# Patient Record
Sex: MALE | Race: WHITE | NOT HISPANIC OR LATINO
[De-identification: names, ages, dates, MRNs, and addresses within clinical notes are randomized per-mention and may not be internally consistent; named-entity substitution may affect disease eponyms.]

---

## 2019-03-26 PROBLEM — Z00.00 ENCOUNTER FOR PREVENTIVE HEALTH EXAMINATION: Status: ACTIVE | Noted: 2019-03-26

## 2019-03-29 ENCOUNTER — APPOINTMENT (OUTPATIENT)
Dept: HEMATOLOGY ONCOLOGY | Facility: CLINIC | Age: 64
End: 2019-03-29
Payer: COMMERCIAL

## 2019-03-29 VITALS
HEART RATE: 85 BPM | RESPIRATION RATE: 16 BRPM | OXYGEN SATURATION: 99 % | WEIGHT: 158 LBS | DIASTOLIC BLOOD PRESSURE: 74 MMHG | BODY MASS INDEX: 23.4 KG/M2 | TEMPERATURE: 98.3 F | HEIGHT: 69 IN | SYSTOLIC BLOOD PRESSURE: 149 MMHG

## 2019-03-29 DIAGNOSIS — R61 GENERALIZED HYPERHIDROSIS: ICD-10-CM

## 2019-03-29 DIAGNOSIS — Z86.59 PERSONAL HISTORY OF OTHER MENTAL AND BEHAVIORAL DISORDERS: ICD-10-CM

## 2019-03-29 DIAGNOSIS — Z87.19 PERSONAL HISTORY OF OTHER DISEASES OF THE DIGESTIVE SYSTEM: ICD-10-CM

## 2019-03-29 DIAGNOSIS — Z78.9 OTHER SPECIFIED HEALTH STATUS: ICD-10-CM

## 2019-03-29 PROCEDURE — 99205 OFFICE O/P NEW HI 60 MIN: CPT

## 2019-03-31 PROBLEM — Z87.19 HISTORY OF HIATAL HERNIA: Status: RESOLVED | Noted: 2019-03-29 | Resolved: 2019-03-31

## 2019-03-31 PROBLEM — Z78.9 CAFFEINE USE: Status: ACTIVE | Noted: 2019-03-29

## 2019-03-31 PROBLEM — Z86.59 HISTORY OF DEPRESSION: Status: RESOLVED | Noted: 2019-03-29 | Resolved: 2019-03-31

## 2019-03-31 PROBLEM — R61 HYPERHIDROSIS: Status: RESOLVED | Noted: 2019-03-29 | Resolved: 2019-03-31

## 2019-03-31 NOTE — ASSESSMENT
[FreeTextEntry1] : This appears to be relatively new finding.\par He does not exhibit signs or symptoms related to these findings, including B symptoms.\par Given the rising white count with a predominance of leukocytes a lymphoproliferative disorder cannot be ruled out at this time.  I will therefore obtain a complete hematological workup.\par This will include a CBC with differential and a blood smear review.\par I will obtain a flow cytometry analysis.\par I will also obtain a BCR-ABL analysis.\par I will also obtain a workup to try to identify reactive reasons for the leukocytosis.\par \par Thank you very much for allowing me to participate in this gentleman's medical care. Should you have any questions or concerns please do not hesitate to call me directly.

## 2019-03-31 NOTE — REVIEW OF SYSTEMS
[Fatigue] : fatigue [Joint Pain] : joint pain [Negative] : Psychiatric [FreeTextEntry1] : NSAID allergy, rash and hives

## 2019-03-31 NOTE — HISTORY OF PRESENT ILLNESS
[de-identified] : This is a very pleasant 63-year-old gentleman the past medical history who on a routine the physical examination was noted to have a leukocytosis and mild thrombocytopenia.  These were repeated and the white count increased to 29,000. The differential showed a predominance of leukocytes.  He is now referred for hematological evaluation of this. He denies  knowledge of any similar finding in the past.  He denies B symptoms.  He denies fevers, chills, headaches, night sweats, loss of appetite or weight, chest pain, chest palpitations, shortness of breath, N/V/D, and pain, new lumps or bumps, signs of blood loss, LE swelling or pain, rashes/ecchymosis/petechiae. [de-identified] : Mr. Duque is here today for an initial visit referred by  for elevated WBC discovered during a routine physical.  [0 - No Distress] : Distress Level: 0

## 2019-04-04 ENCOUNTER — APPOINTMENT (OUTPATIENT)
Dept: HEMATOLOGY ONCOLOGY | Facility: CLINIC | Age: 64
End: 2019-04-04
Payer: COMMERCIAL

## 2019-04-04 VITALS
HEART RATE: 82 BPM | DIASTOLIC BLOOD PRESSURE: 70 MMHG | BODY MASS INDEX: 22.51 KG/M2 | OXYGEN SATURATION: 98 % | RESPIRATION RATE: 17 BRPM | TEMPERATURE: 97.3 F | SYSTOLIC BLOOD PRESSURE: 127 MMHG | WEIGHT: 152 LBS | HEIGHT: 69 IN

## 2019-04-04 PROCEDURE — 99214 OFFICE O/P EST MOD 30 MIN: CPT

## 2019-04-05 NOTE — HISTORY OF PRESENT ILLNESS
[0 - No Distress] : Distress Level: 0 [de-identified] : This is a very pleasant 63-year-old gentleman the past medical history who on a routine the physical examination was noted to have a leukocytosis and mild thrombocytopenia.  These were repeated and the white count increased to 29,000. The differential showed a predominance of leukocytes.  He is now referred for hematological evaluation of this. He denies  knowledge of any similar finding in the past.  He denies B symptoms.  He denies fevers, chills, headaches, night sweats, loss of appetite or weight, chest pain, chest palpitations, shortness of breath, N/V/D, and pain, new lumps or bumps, signs of blood loss, LE swelling or pain, rashes/ecchymosis/petechiae. [de-identified] : Mr. Duque is here today for a follow up visit. He has no complaints to offer.

## 2019-04-05 NOTE — REVIEW OF SYSTEMS
[Fatigue] : fatigue [Joint Pain] : joint pain [Negative] : Heme/Lymph [FreeTextEntry1] : NSAID allergy, rash and hives

## 2019-04-05 NOTE — ASSESSMENT
[FreeTextEntry1] : This appears to be relatively new finding.\par He does not exhibit signs or symptoms related to these findings, including B symptoms.\par Given the rising white count with a predominance of leukocytes a lymphoproliferative disorder cannot be ruled out at this time.  I therefore obtained a complete hematological workup.  This was mostly unremarkable, however, the lab was not able to run a flow cytometry.  This is being resent today.\par Also reported to me today that approximately one to one half years ago he was treated for a tickborne illness through an urgent care in the [facility]. I have requested records from there.\par A CBC with differential and a blood smear review is being repeated today.\par His platelet counts have improved to nearly normal at this time.\par He will return in 3 weeks for f/u.

## 2019-04-12 ENCOUNTER — CHART COPY (OUTPATIENT)
Age: 64
End: 2019-04-12

## 2019-04-25 ENCOUNTER — APPOINTMENT (OUTPATIENT)
Dept: HEMATOLOGY ONCOLOGY | Facility: CLINIC | Age: 64
End: 2019-04-25
Payer: COMMERCIAL

## 2019-04-25 VITALS
RESPIRATION RATE: 18 BRPM | HEART RATE: 64 BPM | DIASTOLIC BLOOD PRESSURE: 70 MMHG | WEIGHT: 150 LBS | TEMPERATURE: 98.1 F | HEIGHT: 69 IN | SYSTOLIC BLOOD PRESSURE: 123 MMHG | OXYGEN SATURATION: 98 % | BODY MASS INDEX: 22.22 KG/M2

## 2019-04-25 PROCEDURE — 99214 OFFICE O/P EST MOD 30 MIN: CPT

## 2019-04-25 NOTE — HISTORY OF PRESENT ILLNESS
[0 - No Distress] : Distress Level: 0 [de-identified] : This is a very pleasant 63-year-old gentleman the past medical history who on a routine the physical examination was noted to have a leukocytosis and mild thrombocytopenia.  These were repeated and the white count increased to 29,000. The differential showed a predominance of leukocytes.  He is now referred for hematological evaluation of this. He denies  knowledge of any similar finding in the past.  He denies B symptoms.  He denies fevers, chills, headaches, night sweats, loss of appetite or weight, chest pain, chest palpitations, shortness of breath, N/V/D, and pain, new lumps or bumps, signs of blood loss, LE swelling or pain, rashes/ecchymosis/petechiae.  The w/u showed a CLL phenotype on his flow cytometry.  He is here for f/u with labs. [de-identified] : Mr. Duque is here today for a follow up visit. No complaints are offered at this time.

## 2019-04-25 NOTE — ASSESSMENT
[FreeTextEntry1] : This appears to be relatively new finding.\par He does not exhibit signs or symptoms related to these findings, including B symptoms.\par A complete hematological workup was undertaken.  This was mostly unremarkable, the flow cytometry revealed monoclonal population of B cell with a phenotype most consistent with CLL. 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 He denies B symptoms and does not have any significant cytopenias.  There were no significantly enlarged LN's on the scan.  His WBC count has remained stable during the time of his visits with me, but given the recent increase I will continue to monitor this closely. Return in 2 months for a followup and labs. He knows that he should call or come back to the office should he develop any new or worrisome symptoms.\par CAT scan results were faxed to Dr. Saldana at [facility] who performed his arteriogram. He has been referred to Dr. Huang of vascular surgery for an evaluation tomorrow.\par I will obtain an US of the abdomen in 6 months to f/u on his spleen size and liver morphology.\par He reports a recent normal PSA level.  \par I have offered to him to fax the CAT scan results to his urologist, however he did not wish for me to do this.\par His platelet counts have improved to nearly normal at this time.\par RTO 2 months for f/u with labs.\par

## 2019-04-25 NOTE — REVIEW OF SYSTEMS
[Palpitations] : palpitations [Depression] : depression [Negative] : Musculoskeletal [Fatigue] : no fatigue [Joint Pain] : no joint pain [FreeTextEntry1] : NSAID allergy, rash and hives

## 2019-04-25 NOTE — REASON FOR VISIT
[Leukocytosis] : leukocytosis [CLL] : chronic lymphocytic leukemia [Follow-Up Visit] : a follow-up visit for

## 2019-06-26 NOTE — REASON FOR VISIT
[Follow-Up Visit] : a follow-up visit for [CLL] : chronic lymphocytic leukemia [Leukocytosis] : leukocytosis

## 2019-06-27 ENCOUNTER — APPOINTMENT (OUTPATIENT)
Dept: HEMATOLOGY ONCOLOGY | Facility: CLINIC | Age: 64
End: 2019-06-27
Payer: COMMERCIAL

## 2019-06-27 VITALS
HEART RATE: 69 BPM | RESPIRATION RATE: 20 BRPM | BODY MASS INDEX: 21.77 KG/M2 | SYSTOLIC BLOOD PRESSURE: 135 MMHG | DIASTOLIC BLOOD PRESSURE: 69 MMHG | TEMPERATURE: 98.6 F | OXYGEN SATURATION: 96 % | HEIGHT: 69 IN | WEIGHT: 147 LBS

## 2019-06-27 PROCEDURE — 99214 OFFICE O/P EST MOD 30 MIN: CPT

## 2019-06-28 NOTE — ASSESSMENT
[FreeTextEntry1] : He does not exhibit signs or symptoms related to these findings, including B symptoms.\par A complete hematological workup was undertaken.  This was mostly unremarkable, the flow cytometry revealed monoclonal population of B cell with a phenotype most consistent with CLL. 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 He denies B symptoms and does not have any significant cytopenias.  There were no significantly enlarged LN's on the scan.  His WBC count has remained stable during the time of his visits with me.  I will continue to monitor this closely. Return in 3 months for a followup and labs. He knows that he should call or come back to the office should he develop any new or worrisome symptoms.\par CAT scan results were faxed to Dr. Saldana at [facility] who performed his arteriogram. He has been referred to Dr. Huang of vascular surgery.  He tells me this is being monitored closely and he will have repeat imaging in within the next month.\par I will obtain an US of the abdomen in 3 months to f/u on his spleen size and liver morphology.\par He reports a recent normal PSA level.  \par I have offered to him to fax the CAT scan results to his urologist, however he did not wish for me to do this.\par His platelet counts remained stable and adequate.\par

## 2019-06-28 NOTE — HISTORY OF PRESENT ILLNESS
[0 - No Distress] : Distress Level: 0 [de-identified] : This is a very pleasant 63-year-old gentleman the past medical history who on a routine the physical examination was noted to have a leukocytosis and mild thrombocytopenia.  These were repeated and the white count increased to 29,000. The differential showed a predominance of leukocytes.  He denies  knowledge of any similar finding in the past.  He denies B symptoms.  He denies fevers, chills, headaches, night sweats, loss of appetite or weight, chest pain, chest palpitations, shortness of breath, N/V/D, and pain, new lumps or bumps, signs of blood loss, LE swelling or pain, rashes/ecchymosis/petechiae.  The w/u showed a CLL phenotype on his flow cytometry.  He is here for f/u with labs. [de-identified] : Mr. Duque is here today for a follow up visit. He offers no complaints today.

## 2019-06-29 ENCOUNTER — TRANSCRIPTION ENCOUNTER (OUTPATIENT)
Age: 64
End: 2019-06-29

## 2019-09-26 ENCOUNTER — APPOINTMENT (OUTPATIENT)
Dept: HEMATOLOGY ONCOLOGY | Facility: CLINIC | Age: 64
End: 2019-09-26
Payer: COMMERCIAL

## 2019-09-26 VITALS
DIASTOLIC BLOOD PRESSURE: 74 MMHG | RESPIRATION RATE: 18 BRPM | OXYGEN SATURATION: 97 % | WEIGHT: 152 LBS | TEMPERATURE: 98 F | HEIGHT: 68.5 IN | HEART RATE: 67 BPM | BODY MASS INDEX: 22.77 KG/M2 | SYSTOLIC BLOOD PRESSURE: 122 MMHG

## 2019-09-26 PROCEDURE — 99214 OFFICE O/P EST MOD 30 MIN: CPT

## 2019-09-27 NOTE — ASSESSMENT
[FreeTextEntry1] : He does not exhibit signs or symptoms related to these findings, including B symptoms.\par A complete hematological workup was undertaken.  This was mostly unremarkable, the flow cytometry revealed monoclonal population of B cell with a phenotype most consistent with CLL. 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 He denies B symptoms and does not have any significant cytopenias.  There were no significantly enlarged LN's on the scan.  His WBC count has remained stable during the time of his visits with me.  I will continue to monitor this closely. Return in 3 months for a followup and labs. He knows that he should call or come back to the office should he develop any new or worrisome symptoms.\par CAT scan results were faxed to Dr. Saldana at [facility] who performed his arteriogram. He has been referred to Dr. Huang of vascular surgery.  He tells me this is being monitored closely and he will have repeat imaging.\par I will obtain an US of the abdomen to f/u on his spleen size and liver morphology.\par He reports a recent normal PSA level.  \par I have offered to him to fax the CAT scan results to his urologist, however he did not wish for me to do this.\par His platelet counts remained stable and adequate.\par

## 2019-09-27 NOTE — PHYSICAL EXAM
[Fully active, able to carry on all pre-disease performance without restriction] : Status 0 - Fully active, able to carry on all pre-disease performance without restriction [Normal] : affect appropriate [de-identified] : no palpable splenomegaly

## 2019-09-27 NOTE — HISTORY OF PRESENT ILLNESS
[0 - No Distress] : Distress Level: 0 [de-identified] : This is a very pleasant 63-year-old gentleman the past medical history who on a routine the physical examination was noted to have a leukocytosis and mild thrombocytopenia.  These were repeated and the white count increased to 29,000. The differential showed a predominance of leukocytes.  He denies  knowledge of any similar finding in the past.  He denies B symptoms.  He denies fevers, chills, headaches, night sweats, loss of appetite or weight, chest pain, chest palpitations, shortness of breath, N/V/D, and pain, new lumps or bumps, signs of blood loss, LE swelling or pain, rashes/ecchymosis/petechiae.  The w/u showed a CLL phenotype on his flow cytometry.  He is here for f/u with labs. [de-identified] : Mr. Duque is here today for a follow up visit.  He offers no complaints.

## 2020-01-07 ENCOUNTER — APPOINTMENT (OUTPATIENT)
Dept: HEMATOLOGY ONCOLOGY | Facility: CLINIC | Age: 65
End: 2020-01-07
Payer: COMMERCIAL

## 2020-01-07 VITALS
RESPIRATION RATE: 18 BRPM | DIASTOLIC BLOOD PRESSURE: 67 MMHG | HEART RATE: 76 BPM | WEIGHT: 152 LBS | BODY MASS INDEX: 22.77 KG/M2 | HEIGHT: 68.5 IN | SYSTOLIC BLOOD PRESSURE: 124 MMHG | TEMPERATURE: 97.8 F | OXYGEN SATURATION: 97 %

## 2020-01-07 PROCEDURE — 99214 OFFICE O/P EST MOD 30 MIN: CPT

## 2020-01-07 NOTE — PHYSICAL EXAM
[Fully active, able to carry on all pre-disease performance without restriction] : Status 0 - Fully active, able to carry on all pre-disease performance without restriction [Normal] : affect appropriate [de-identified] : no palpable splenomegaly

## 2020-01-07 NOTE — ASSESSMENT
[FreeTextEntry1] : He does not exhibit signs or symptoms related to these findings, including B symptoms.\par A complete hematological workup was undertaken.  This was mostly unremarkable, the flow cytometry revealed monoclonal population of B cell with a phenotype most consistent with CLL. 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 He denies B symptoms and does not have any significant cytopenias.  There were no significantly enlarged LN's on the scan.  His WBC count has remained stable during the time of his visits with me.  I will continue to monitor this closely. Return in 3 months for a followup and labs. He knows that he should call or come back to the office should he develop any new or worrisome symptoms.\par CAT scan results were faxed to Dr. Saldana at [facility] who performed his arteriogram. He has been referred to Dr. Huang of vascular surgery.  He tells me this is being monitored closely and he will have repeat imaging 1 year from the last.\par I obtained an US of the abdomen on 10/9/19 that showed a nl liver structure and size and a spleen measuring 15.6 x 9.5 x 14.9 cm that was slightly improved from 4/12/19.\par He reports a recent normal PSA level.  \par I have offered to him to fax the CAT scan results to his urologist, however he did not wish for me to do this.\par His platelet counts remained stable and adequate.\par

## 2020-01-07 NOTE — REVIEW OF SYSTEMS
[Anxiety] : anxiety [Negative] : Endocrine [Recent Change In Weight] : ~T no recent weight change [Night Sweats] : no night sweats [Abdominal Pain] : no abdominal pain [FreeTextEntry1] : NSAID allergy, rash and hives

## 2020-01-07 NOTE — HISTORY OF PRESENT ILLNESS
[0 - No Distress] : Distress Level: 0 [de-identified] : This is a very pleasant 64-year-old gentleman the past medical history who on a routine the physical examination was noted to have a leukocytosis and mild thrombocytopenia.  These were repeated and the white count increased to 29,000. The differential showed a predominance of leukocytes.  He denies  knowledge of any similar finding in the past.  He denies B symptoms.  He denies fevers, chills, headaches, night sweats, loss of appetite or weight, chest pain, chest palpitations, shortness of breath, N/V/D, and pain, new lumps or bumps, signs of blood loss, LE swelling or pain, rashes/ecchymosis/petechiae.  The w/u showed a CLL phenotype on his flow cytometry.  He is here for f/u with labs. [de-identified] : Mr. Duque is here today for a follow up visit. He offers no new complaints. Colonoscopy completed and unremarkable.

## 2020-02-04 ENCOUNTER — APPOINTMENT (OUTPATIENT)
Dept: HEMATOLOGY ONCOLOGY | Facility: CLINIC | Age: 65
End: 2020-02-04

## 2020-02-04 ENCOUNTER — APPOINTMENT (OUTPATIENT)
Dept: HEMATOLOGY ONCOLOGY | Facility: CLINIC | Age: 65
End: 2020-02-04
Payer: COMMERCIAL

## 2020-02-04 VITALS
DIASTOLIC BLOOD PRESSURE: 70 MMHG | WEIGHT: 169 LBS | RESPIRATION RATE: 18 BRPM | OXYGEN SATURATION: 97 % | TEMPERATURE: 98.7 F | HEIGHT: 68.5 IN | SYSTOLIC BLOOD PRESSURE: 125 MMHG | HEART RATE: 80 BPM | BODY MASS INDEX: 25.32 KG/M2

## 2020-02-04 PROCEDURE — 99213 OFFICE O/P EST LOW 20 MIN: CPT

## 2020-02-14 NOTE — REVIEW OF SYSTEMS
[Anxiety] : anxiety [Negative] : Allergic/Immunologic [Night Sweats] : no night sweats [Recent Change In Weight] : ~T no recent weight change [Abdominal Pain] : no abdominal pain [FreeTextEntry1] : NSAID allergy, rash and hives

## 2020-02-14 NOTE — HISTORY OF PRESENT ILLNESS
[0 - No Distress] : Distress Level: 0 [de-identified] : This is a very pleasant 64-year-old gentleman the past medical history who on a routine the physical examination was noted to have a leukocytosis and mild thrombocytopenia.  These were repeated and the white count increased to 29,000. The differential showed a predominance of leukocytes.  He denies  knowledge of any similar finding in the past.  He denies B symptoms.  He denies fevers, chills, headaches, night sweats, loss of appetite or weight, chest pain, chest palpitations, shortness of breath, N/V/D, and pain, new lumps or bumps, signs of blood loss, LE swelling or pain, rashes/ecchymosis/petechiae.  The w/u showed a CLL phenotype on his flow cytometry.  He is here for f/u with labs. [FreeTextEntry1] : CLL observation [de-identified] : Mr. Duque is here today for a follow up visit for CLL. . He offers no new complaints, infections, bleeding, B symptoms  but he is followed by  at [place] and is more anxious about prognosis although she concurred with observation.

## 2020-02-14 NOTE — PHYSICAL EXAM
[Fully active, able to carry on all pre-disease performance without restriction] : Status 0 - Fully active, able to carry on all pre-disease performance without restriction [Normal] : affect appropriate [de-identified] : no palpable splenomegaly

## 2020-02-14 NOTE — ASSESSMENT
[FreeTextEntry1] : He does not exhibit signs or symptoms related to these findings, including B symptoms.\par A complete hematological workup was undertaken.  This was mostly unremarkable, the flow cytometry revealed monoclonal population of B cell with a phenotype most consistent with CLL. 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 He denies B symptoms and does not have any significant cytopenias.  There were no significantly enlarged LN's on the scan.  His WBC count has remained stable during the time of his visits with me.  I will continue to monitor this closely. Return in 3 months for a followup and labs. He knows that he should call or come back to the office should he develop any new or worrisome symptoms.\par CAT scan results were faxed to Dr. Saldana at [facility] who performed his arteriogram. He has been referred to Dr. Huang of vascular surgery.  He tells me this is being monitored closely and he will have repeat imaging 1 year from the last.\par I obtained an US of the abdomen on 10/9/19 that showed a nl liver structure and size and a spleen measuring 15.6 x 9.5 x 14.9 cm that was slightly improved from 4/12/19.\par He reports a recent normal PSA level.  \par I have offered to him to fax the CAT scan results to his urologist, however he did not wish for me to do this.\par His bloodwork is essentially stable and he denies any new symptoms.  We will continue with close observation.\par History of present illness, review of systems, physical exam and treatment plan reviewed with .\par Office visit in 4 weeks or prn for new or worsening symptoms. \par

## 2020-03-03 ENCOUNTER — APPOINTMENT (OUTPATIENT)
Dept: HEMATOLOGY ONCOLOGY | Facility: CLINIC | Age: 65
End: 2020-03-03
Payer: COMMERCIAL

## 2020-03-03 VITALS
RESPIRATION RATE: 18 BRPM | BODY MASS INDEX: 25.17 KG/M2 | OXYGEN SATURATION: 98 % | HEIGHT: 68.5 IN | HEART RATE: 76 BPM | DIASTOLIC BLOOD PRESSURE: 72 MMHG | WEIGHT: 168 LBS | TEMPERATURE: 98 F | SYSTOLIC BLOOD PRESSURE: 125 MMHG

## 2020-03-03 PROCEDURE — 99214 OFFICE O/P EST MOD 30 MIN: CPT

## 2020-03-03 NOTE — ASSESSMENT
[FreeTextEntry1] : He does not exhibit signs or symptoms related to these findings, including B symptoms.\par A complete hematological workup was undertaken.  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 He denies B symptoms and does not have any significant cytopenias.  There were no significantly enlarged LN's on the scan.  His WBC count has remained stable during the time of his visits with me.  I will continue to monitor this closely. Return in 3 months for a followup and labs. He knows that he should call or come back to the office should he develop any new or worrisome symptoms.\par CAT scan results were faxed to Dr. Saldana at [facility] who performed his arteriogram. He has been referred to Dr. Huang of vascular surgery.  He tells me this is being monitored closely and he will have repeat imaging 1 year from the last.\par I obtained an US of the abdomen on 10/9/19 that showed a nl liver structure and size and a spleen measuring 15.6 x 9.5 x 14.9 cm that was slightly improved from 4/12/19.\par He reports a recent normal PSA level.  \par I have offered to him to fax the CAT scan results to his urologist, however he did not wish for me to do this.\par We will check his CBD and CMP today to assure stability.  We will continue with close observation.\par Office visit in 3 months or prn for new or worsening symptoms. \par

## 2020-03-03 NOTE — REVIEW OF SYSTEMS
[Anxiety] : anxiety [Negative] : Allergic/Immunologic [de-identified] : klonopin in am for anxiety [FreeTextEntry1] : NSAID allergy, rash and hives

## 2020-03-03 NOTE — PHYSICAL EXAM
[Fully active, able to carry on all pre-disease performance without restriction] : Status 0 - Fully active, able to carry on all pre-disease performance without restriction [Normal] : affect appropriate [de-identified] : no palpable splenomegaly

## 2020-03-03 NOTE — HISTORY OF PRESENT ILLNESS
[0 - No Distress] : Distress Level: 0 [de-identified] : This is a very pleasant 64-year-old gentleman the past medical history who on a routine the physical examination was noted to have a leukocytosis and mild thrombocytopenia.  These were repeated and the white count increased to 29,000. The differential showed a predominance of leukocytes.  He denies  knowledge of any similar finding in the past.  He denies B symptoms.  He denies fevers, chills, headaches, night sweats, loss of appetite or weight, chest pain, chest palpitations, shortness of breath, N/V/D, and pain, new lumps or bumps, signs of blood loss, LE swelling or pain, rashes/ecchymosis/petechiae.  The w/u showed a CLL phenotype on his flow cytometry.  He is here for f/u with labs. [FreeTextEntry1] : CLL observation [de-identified] : Mr. Duque is here today for a follow up visit for CLL.  He offers no new complaints today.

## 2020-03-06 ENCOUNTER — APPOINTMENT (OUTPATIENT)
Dept: HEMATOLOGY ONCOLOGY | Facility: CLINIC | Age: 65
End: 2020-03-06
Payer: COMMERCIAL

## 2020-03-06 VITALS
OXYGEN SATURATION: 97 % | WEIGHT: 168 LBS | SYSTOLIC BLOOD PRESSURE: 105 MMHG | HEART RATE: 68 BPM | HEIGHT: 68.5 IN | DIASTOLIC BLOOD PRESSURE: 68 MMHG | RESPIRATION RATE: 18 BRPM | BODY MASS INDEX: 25.17 KG/M2 | TEMPERATURE: 98.5 F

## 2020-03-06 PROCEDURE — 99499A: CUSTOM | Mod: NC

## 2020-04-21 ENCOUNTER — APPOINTMENT (OUTPATIENT)
Dept: HEMATOLOGY ONCOLOGY | Facility: CLINIC | Age: 65
End: 2020-04-21

## 2020-04-21 NOTE — HISTORY OF PRESENT ILLNESS
[0 - No Distress] : Distress Level: 0 [Home] : at home, [unfilled] , at the time of the visit. [Medical Office: ([facility])___] : at the medical office located in  [Patient] : the patient [Self] : self [de-identified] : This is a very pleasant 64-year-old gentleman the past medical history who on a routine the physical examination was noted to have a leukocytosis and mild thrombocytopenia.  These were repeated and the white count increased to 29,000. The differential showed a predominance of leukocytes.  He denies  knowledge of any similar finding in the past.  He denies B symptoms.  He denies fevers, chills, headaches, night sweats, loss of appetite or weight, chest pain, chest palpitations, shortness of breath, N/V/D, and pain, new lumps or bumps, signs of blood loss, LE swelling or pain, rashes/ecchymosis/petechiae.  The w/u showed a CLL phenotype on his flow cytometry.  He is here for f/u with labs. [FreeTextEntry1] : CLL observation [de-identified] : Mr. Duque is here today for a follow up visit for CLL.  He offers no new complaints today.

## 2020-04-21 NOTE — PHYSICAL EXAM
[Fully active, able to carry on all pre-disease performance without restriction] : Status 0 - Fully active, able to carry on all pre-disease performance without restriction [Normal] : grossly intact [de-identified] : no palpable splenomegaly

## 2020-04-21 NOTE — ASSESSMENT
[FreeTextEntry1] : He does not exhibit signs or symptoms related to these findings, including B symptoms.\par A complete hematological workup was undertaken.  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 He denies B symptoms and does not have any significant cytopenias.  There were no significantly enlarged LN's on the scan.  His WBC count has remained stable during the time of his visits with me.  I will continue to monitor this closely. Return in 3 months for a followup and labs. He knows that he should call or come back to the office should he develop any new or worrisome symptoms.\par CAT scan results were faxed to Dr. Saldana at [facility] who performed his arteriogram. He has been referred to Dr. Huang of vascular surgery.  He tells me this is being monitored closely and he will have repeat imaging 1 year from the last.\par I obtained an US of the abdomen on 10/9/19 that showed a nl liver structure and size and a spleen measuring 15.6 x 9.5 x 14.9 cm that was slightly improved from 4/12/19.\par He reports a recent normal PSA level.  \par I have offered to him to fax the CAT scan results to his urologist, however he did not wish for me to do this.\par We will check his CBD and CMP today to assure stability.  We will continue with close observation.\par Office visit in 3 months or prn for new or worsening symptoms. \par

## 2020-04-21 NOTE — REVIEW OF SYSTEMS
[Anxiety] : anxiety [Negative] : Heme/Lymph [de-identified] : klonopin in am for anxiety [FreeTextEntry1] : NSAID allergy, rash and hives

## 2020-04-30 ENCOUNTER — APPOINTMENT (OUTPATIENT)
Dept: HEMATOLOGY ONCOLOGY | Facility: CLINIC | Age: 65
End: 2020-04-30

## 2020-06-22 NOTE — REASON FOR VISIT
[Follow-Up Visit] : a follow-up visit for [Leukocytosis] : leukocytosis [CLL] : chronic lymphocytic leukemia

## 2020-06-23 ENCOUNTER — APPOINTMENT (OUTPATIENT)
Dept: HEMATOLOGY ONCOLOGY | Facility: CLINIC | Age: 65
End: 2020-06-23
Payer: COMMERCIAL

## 2020-06-23 VITALS
OXYGEN SATURATION: 96 % | RESPIRATION RATE: 18 BRPM | DIASTOLIC BLOOD PRESSURE: 62 MMHG | TEMPERATURE: 98.4 F | BODY MASS INDEX: 23.15 KG/M2 | HEART RATE: 77 BPM | SYSTOLIC BLOOD PRESSURE: 105 MMHG | WEIGHT: 154.5 LBS

## 2020-06-23 PROCEDURE — 99214 OFFICE O/P EST MOD 30 MIN: CPT

## 2020-06-23 NOTE — HISTORY OF PRESENT ILLNESS
[0 - No Distress] : Distress Level: 0 [Medical Office: ([facility])___] : at the medical office located in  [Patient] : the patient [Home] : at home, [unfilled] , at the time of the visit. [Self] : self [de-identified] : This is a very pleasant 64-year-old gentleman the past medical history who on a routine the physical examination was noted to have a leukocytosis and mild thrombocytopenia.  These were repeated and the white count increased to 29,000. The differential showed a predominance of leukocytes.  He denies  knowledge of any similar finding in the past.  He denies B symptoms.  He denies fevers, chills, headaches, night sweats, loss of appetite or weight, chest pain, chest palpitations, shortness of breath, N/V/D, and pain, new lumps or bumps, signs of blood loss, LE swelling or pain, rashes/ecchymosis/petechiae.  The w/u showed a CLL phenotype on his flow cytometry.  He is here for f/u with labs. [FreeTextEntry1] : CLL observation [de-identified] : Mr. Duque is here today for a follow up visit for CLL.  He offers no new complaints today.

## 2020-06-23 NOTE — PHYSICAL EXAM
[Fully active, able to carry on all pre-disease performance without restriction] : Status 0 - Fully active, able to carry on all pre-disease performance without restriction [Normal] : normal appearance, no rash, nodules, vesicles, ulcers, erythema [de-identified] : no palpable splenomegaly

## 2020-06-23 NOTE — REVIEW OF SYSTEMS
[Anxiety] : anxiety [Negative] : Allergic/Immunologic [de-identified] : klonopin in am for anxiety [FreeTextEntry1] : NSAID allergy, rash and hives

## 2020-06-23 NOTE — ASSESSMENT
[FreeTextEntry1] : He does not exhibit signs or symptoms related to these findings, including B symptoms.\par A complete hematological workup was undertaken.  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 He denies B symptoms and does not have any significant cytopenias.  There were no significantly enlarged LN's on the scan.  His WBC count has remained stable during the time of his visits with me.  I will continue to monitor this closely. Return in 3 months for a followup and labs. He knows that he should call or come back to the office should he develop any new or worrisome symptoms.\par CAT scan results were faxed to Dr. Saldana at [facility] who performed his arteriogram. He has been referred to Dr. Huang of vascular surgery.  He tells me this is being monitored closely and he will have repeat imaging 1 year from the last.  Next follow up is scheduled for October.\par I obtained an US of the abdomen on 10/9/19 that showed a nl liver structure and size and a spleen measuring 15.6 x 9.5 x 14.9 cm that was slightly improved from 4/12/19.\par He reports a recent normal PSA level.  \par I have offered to him to fax the CAT scan results to his urologist, however he did not wish for me to do this.\par We will check his CBD and CMP today to assure stability.  We will continue with close observation.\par Office visit in 3 months or prn for new or worsening symptoms. \par

## 2020-07-13 DIAGNOSIS — Z80.52 FAMILY HISTORY OF MALIGNANT NEOPLASM OF BLADDER: ICD-10-CM

## 2020-07-14 ENCOUNTER — APPOINTMENT (OUTPATIENT)
Dept: UROLOGY | Facility: CLINIC | Age: 65
End: 2020-07-14
Payer: COMMERCIAL

## 2020-07-14 VITALS
WEIGHT: 157 LBS | TEMPERATURE: 98.7 F | BODY MASS INDEX: 23.52 KG/M2 | HEIGHT: 68.5 IN | HEART RATE: 82 BPM | SYSTOLIC BLOOD PRESSURE: 121 MMHG | DIASTOLIC BLOOD PRESSURE: 78 MMHG

## 2020-07-14 PROCEDURE — 51798 US URINE CAPACITY MEASURE: CPT | Mod: 59

## 2020-07-14 PROCEDURE — 36415 COLL VENOUS BLD VENIPUNCTURE: CPT

## 2020-07-14 PROCEDURE — 99204 OFFICE O/P NEW MOD 45 MIN: CPT

## 2020-07-14 PROCEDURE — 76872 US TRANSRECTAL: CPT

## 2020-07-14 NOTE — PHYSICAL EXAM
[General Appearance - Well Developed] : well developed [General Appearance - Well Nourished] : well nourished [Normal Appearance] : normal appearance [General Appearance - In No Acute Distress] : no acute distress [Well Groomed] : well groomed [Edema] : no peripheral edema [Respiration, Rhythm And Depth] : normal respiratory rhythm and effort [Exaggerated Use Of Accessory Muscles For Inspiration] : no accessory muscle use [Abdomen Soft] : soft [Abdomen Tenderness] : non-tender [Costovertebral Angle Tenderness] : no ~M costovertebral angle tenderness [Urethral Meatus] : meatus normal [Urinary Bladder Findings] : the bladder was normal on palpation [Scrotum] : the scrotum was normal [Testes Mass (___cm)] : there were no testicular masses [No Prostate Nodules] : no prostate nodules [FreeTextEntry1] : The prostate is symmetrical smooth and soft and digital rectal exam, There is a modest left varicocele as well as telangiectasias on the scrotum on the Right [Normal Station and Gait] : the gait and station were normal for the patient's age [] : no rash [No Focal Deficits] : no focal deficits [Oriented To Time, Place, And Person] : oriented to person, place, and time [Affect] : the affect was normal [Mood] : the mood was normal [Not Anxious] : not anxious [No Palpable Adenopathy] : no palpable adenopathy

## 2020-07-14 NOTE — ADDENDUM
[FreeTextEntry1] : Transrectal ultrasound of the prostate to determine prostate signs\par \par With the patient in left lateral position 5 cc of lidocaine gel was instilled into the anal canal the ultrasound probe was introduced\par Images of the prostate obtained in serial section from base to apex\par Prostate volume was measured at 35.4 g\par \par PVR\par A transabdominal ultrasound was performed\par Images of the bladder obtained in the transverse and longitudinal\par No excessive post void residual was noted

## 2020-07-14 NOTE — ASSESSMENT
[FreeTextEntry1] : This is an initial visit follow up for this 64-year-old patient on whom I performed a Bilateral vasectomy years ago\par He has a family history of prostate cancer in his father who developed cancer as an octogenarian\par The patient has a recent rise in his PSA from 1+ to 4.4 and has nocturia as his only complaint\par A digital rectal exam the prostate feels quite benign it measures 35 g on ultrasound of the prostate giving him an acceptable PSA density # 0.12\par A repeated PSA this rising L. give him a course of antibiotics\par I recommended a trial of alfuzosin which he has agreed to\par On transrectal ultrasound of the prostate trachea the patient has significant calcification consistent with prostatitis

## 2020-07-15 LAB — PSA SERPL-MCNC: 3.93 NG/ML

## 2020-09-21 RX ORDER — ALFUZOSIN HYDROCHLORIDE 10 MG/1
10 TABLET, EXTENDED RELEASE ORAL
Qty: 30 | Refills: 1 | Status: DISCONTINUED | COMMUNITY
Start: 2020-07-14 | End: 2020-09-21

## 2020-09-23 ENCOUNTER — APPOINTMENT (OUTPATIENT)
Dept: HEMATOLOGY ONCOLOGY | Facility: CLINIC | Age: 65
End: 2020-09-23
Payer: COMMERCIAL

## 2020-09-23 VITALS
DIASTOLIC BLOOD PRESSURE: 67 MMHG | BODY MASS INDEX: 25.76 KG/M2 | OXYGEN SATURATION: 97 % | TEMPERATURE: 98.2 F | RESPIRATION RATE: 18 BRPM | HEART RATE: 75 BPM | WEIGHT: 158.38 LBS | HEIGHT: 65.8 IN | SYSTOLIC BLOOD PRESSURE: 115 MMHG

## 2020-09-23 PROCEDURE — 99213 OFFICE O/P EST LOW 20 MIN: CPT

## 2020-09-24 ENCOUNTER — APPOINTMENT (OUTPATIENT)
Dept: UROLOGY | Facility: CLINIC | Age: 65
End: 2020-09-24
Payer: COMMERCIAL

## 2020-09-24 VITALS
HEART RATE: 76 BPM | SYSTOLIC BLOOD PRESSURE: 118 MMHG | BODY MASS INDEX: 26.02 KG/M2 | DIASTOLIC BLOOD PRESSURE: 73 MMHG | TEMPERATURE: 97.6 F | HEIGHT: 65.8 IN | WEIGHT: 160 LBS

## 2020-09-24 PROCEDURE — 99213 OFFICE O/P EST LOW 20 MIN: CPT

## 2020-09-24 NOTE — ASSESSMENT
[FreeTextEntry1] : This is a followup visit for this 65 for a patient with a family history of prostate cancer who came for evaluation because there was an increase in his PSA and digital rectal exam suggested an admitting\par An ultrasound of the prostate shows calcification consistent with prostatitis\par He was given a course of antibiotics and given Flomax and has not noted a change\par I will repeat the PSA is stable I would recommend a come back in 6 months to monitor the elevated PSA\par In the meantime him doubling up on his Flomax and he will call me back to report whether it is effective or not\par

## 2020-09-25 LAB — PSA SERPL-MCNC: 3.5 NG/ML

## 2020-09-29 LAB
DEPRECATED KAPPA LC FREE/LAMBDA SER: 0.39 RATIO
KAPPA LC CSF-MCNC: 4.24 MG/DL
KAPPA LC SERPL-MCNC: 1.65 MG/DL

## 2020-09-29 NOTE — HISTORY OF PRESENT ILLNESS
[0 - No Distress] : Distress Level: 0 [de-identified] : This is a very pleasant 65-year-old gentleman the past medical history who on a routine the physical examination was noted to have a leukocytosis and mild thrombocytopenia.  These were repeated and the white count increased to 29,000. The differential showed a predominance of leukocytes.  He denies  knowledge of any similar finding in the past.  He denies B symptoms.  He denies fevers, chills, headaches, night sweats, loss of appetite or weight, chest pain, chest palpitations, shortness of breath, N/V/D, and pain, new lumps or bumps, signs of blood loss, LE swelling or pain, rashes/ecchymosis/petechiae.  The w/u showed a CLL phenotype on his flow cytometry.  He is here for f/u with labs. [FreeTextEntry1] : CLL observation [de-identified] : Mr. Duque is here today for a follow up visit for CLL.  He has had nocturia and decreased flow. He saw urology and was started on Flomax. He denies "B" symptoms. omplaints today.

## 2020-09-29 NOTE — REVIEW OF SYSTEMS
[Anxiety] : anxiety [Negative] : Allergic/Immunologic [de-identified] : klonopin in am for anxiety [FreeTextEntry1] : NSAID allergy, rash and hives

## 2020-09-29 NOTE — PHYSICAL EXAM
[Fully active, able to carry on all pre-disease performance without restriction] : Status 0 - Fully active, able to carry on all pre-disease performance without restriction [Normal] : affect appropriate [de-identified] : no palpable splenomegaly

## 2020-09-29 NOTE — ASSESSMENT
[FreeTextEntry1] : He does not exhibit signs or symptoms related to these findings, including B symptoms.\par A complete hematological workup was undertaken.  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 He denies B symptoms and does not have any significant cytopenias.  There were no significantly enlarged LN's on the scan.  His WBC count has remained stable during the time of his visits with me.  I will continue to monitor this closely. Return in 3 months for a followup and labs. He knows that he should call or come back to the office should he develop any new or worrisome symptoms.\par CAT scan results were faxed to Dr. Saldana at [facility] who performed his arteriogram. He has been referred to Dr. Huang of vascular surgery.  He tells me this is being monitored closely and he will have repeat imaging 1 year from the last.  Next follow up is scheduled for October.\par I obtained an US of the abdomen on 10/9/19 that showed a nl liver structure and size and a spleen measuring 15.6 x 9.5 x 14.9 cm that was slightly improved from 4/12/19.\par He reports a recent normal PSA level.  \par I have offered to him to fax the CAT scan results to his urologist, however he did not wish for me to do this.\par Reviewed results of CBC and CMP which were stable.  He denies new symptoms. Continue observation.\par Continue routine, age-appropriate, healthcare maintenance \par History of present illness, review of systems, physical exam and treatment plan reviewed with .\par Office visit in 3 months or prn for new or worsening symptoms. \par

## 2021-01-07 ENCOUNTER — APPOINTMENT (OUTPATIENT)
Dept: HEMATOLOGY ONCOLOGY | Facility: CLINIC | Age: 66
End: 2021-01-07
Payer: COMMERCIAL

## 2021-01-07 VITALS
HEIGHT: 68 IN | DIASTOLIC BLOOD PRESSURE: 74 MMHG | BODY MASS INDEX: 24.25 KG/M2 | SYSTOLIC BLOOD PRESSURE: 125 MMHG | OXYGEN SATURATION: 97 % | HEART RATE: 68 BPM | RESPIRATION RATE: 18 BRPM | TEMPERATURE: 98.2 F | WEIGHT: 160 LBS

## 2021-01-07 PROCEDURE — 99214 OFFICE O/P EST MOD 30 MIN: CPT

## 2021-01-07 PROCEDURE — 99072 ADDL SUPL MATRL&STAF TM PHE: CPT

## 2021-01-11 ENCOUNTER — NON-APPOINTMENT (OUTPATIENT)
Age: 66
End: 2021-01-11

## 2021-01-11 LAB
DEPRECATED KAPPA LC FREE/LAMBDA SER: 0.33 RATIO
IGA SER QL IEP: 102 MG/DL
IGG SER QL IEP: 899 MG/DL
IGM SER QL IEP: 32 MG/DL
KAPPA LC CSF-MCNC: 4.27 MG/DL
KAPPA LC SERPL-MCNC: 1.42 MG/DL

## 2021-01-20 ENCOUNTER — APPOINTMENT (OUTPATIENT)
Dept: UROLOGY | Facility: CLINIC | Age: 66
End: 2021-01-20
Payer: COMMERCIAL

## 2021-01-20 VITALS
DIASTOLIC BLOOD PRESSURE: 81 MMHG | SYSTOLIC BLOOD PRESSURE: 122 MMHG | HEIGHT: 68 IN | TEMPERATURE: 98.2 F | WEIGHT: 160 LBS | BODY MASS INDEX: 24.25 KG/M2 | HEART RATE: 69 BPM

## 2021-01-20 PROCEDURE — 99213 OFFICE O/P EST LOW 20 MIN: CPT

## 2021-01-20 PROCEDURE — 99072 ADDL SUPL MATRL&STAF TM PHE: CPT

## 2021-01-20 PROCEDURE — 36415 COLL VENOUS BLD VENIPUNCTURE: CPT

## 2021-01-20 RX ORDER — SULFAMETHOXAZOLE AND TRIMETHOPRIM 800; 160 MG/1; MG/1
800-160 TABLET ORAL TWICE DAILY
Qty: 42 | Refills: 1 | Status: DISCONTINUED | COMMUNITY
Start: 2020-09-24 | End: 2021-01-20

## 2021-01-20 RX ORDER — TAMSULOSIN HYDROCHLORIDE 0.4 MG/1
0.4 CAPSULE ORAL
Qty: 30 | Refills: 1 | Status: DISCONTINUED | COMMUNITY
Start: 2020-08-24 | End: 2021-01-20

## 2021-01-20 NOTE — ASSESSMENT
[FreeTextEntry1] : This is a follow up visit for this 65-year-old patient with a family history of prostate cancer and who had a recent rise in his PSA to 4.4 the digital rectal exam felt quite benign the patient was given a course of antibiotics \par A transrectal ultrasound came in at 35 g give him an acceptable PSA density\par His urine routine followup he does have nocturia and difficulty urinating at night despite being on 2 Flomax a day\par I have added Cialis 5 mg daily and he will report back whether there  is an improvement\par we spoke about several of the invasive procedures to correct symptoms of bladder outlet obstruction\par I don't think any procedure estimate this point\par We did speak to him in detail about a Credé maneuver to help him urinate at night which is when he has the most difficult time urinating

## 2021-01-21 LAB
ALBUMIN SERPL ELPH-MCNC: 4.8 G/DL
ALP BLD-CCNC: 65 U/L
ALT SERPL-CCNC: 21 U/L
ANION GAP SERPL CALC-SCNC: 12 MMOL/L
AST SERPL-CCNC: 27 U/L
BILIRUB SERPL-MCNC: 0.6 MG/DL
BUN SERPL-MCNC: 20 MG/DL
CALCIUM SERPL-MCNC: 9.5 MG/DL
CHLORIDE SERPL-SCNC: 102 MMOL/L
CO2 SERPL-SCNC: 26 MMOL/L
CREAT SERPL-MCNC: 1.17 MG/DL
GLUCOSE SERPL-MCNC: 96 MG/DL
POTASSIUM SERPL-SCNC: 4.6 MMOL/L
PROT SERPL-MCNC: 6.6 G/DL
PSA SERPL-MCNC: 2.67 NG/ML
SODIUM SERPL-SCNC: 141 MMOL/L

## 2021-01-26 RX ORDER — TADALAFIL 5 MG/1
5 TABLET ORAL
Qty: 60 | Refills: 1 | Status: DISCONTINUED | COMMUNITY
Start: 2021-01-20 | End: 2021-01-26

## 2021-02-28 NOTE — ASSESSMENT
[FreeTextEntry1] : He does not exhibit signs or symptoms related to these findings, including B symptoms.\par A complete hematological workup was undertaken.  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 He denies B symptoms and does not have any significant cytopenias.  There were no significantly enlarged LN's on the scan.  His WBC count has remained stable during the time of his visits with me.  I will continue to monitor this closely. Return in 3 months for a followup and labs. He knows that he should call or come back to the office should he develop any new or worrisome symptoms.\par CAT scan results were faxed to Dr. Saldana at [facility] who performed his arteriogram. He has been referred to Dr. Huang of vascular surgery.  He tells me this is being monitored closely and he will have repeat imaging 1 year from the last.  \par I obtained an US of the abdomen on 10/9/19 that showed a nl liver structure and size and a spleen measuring 15.6 x 9.5 x 14.9 cm that was slightly improved from 4/12/19.\par He reports a recent normal PSA level.  \par I have offered to him to fax the CAT scan results to his urologist, however he did not wish for me to do this.\par Reviewed results of CBC and CMP which were stable.  He denies new symptoms. Continue observation.\par Continue routine, age-appropriate, healthcare maintenance \par Office visit in 3 months or prn for new or worsening symptoms. \par

## 2021-02-28 NOTE — HISTORY OF PRESENT ILLNESS
[de-identified] : This is a very pleasant 65-year-old gentleman the past medical history who on a routine the physical examination was noted to have a leukocytosis and mild thrombocytopenia.  These were repeated and the white count increased to 29,000. The differential showed a predominance of leukocytes.  He denies  knowledge of any similar finding in the past.  He denies B symptoms.  He denies fevers, chills, headaches, night sweats, loss of appetite or weight, chest pain, chest palpitations, shortness of breath, N/V/D, and pain, new lumps or bumps, signs of blood loss, LE swelling or pain, rashes/ecchymosis/petechiae.  The w/u showed a CLL phenotype on his flow cytometry.  He is here for f/u with labs. [FreeTextEntry1] : CLL observation [de-identified] : Mr. Duque is here today for a follow up visit for CLL.  He has had nocturia and decreased flow. He saw urology and was started on Flomax. He denies "B" symptoms. omplaints today.

## 2021-03-24 ENCOUNTER — APPOINTMENT (OUTPATIENT)
Dept: UROLOGY | Facility: CLINIC | Age: 66
End: 2021-03-24

## 2021-03-25 ENCOUNTER — APPOINTMENT (OUTPATIENT)
Dept: HEMATOLOGY ONCOLOGY | Facility: CLINIC | Age: 66
End: 2021-03-25
Payer: COMMERCIAL

## 2021-03-25 VITALS
HEIGHT: 68 IN | WEIGHT: 162 LBS | TEMPERATURE: 98.5 F | HEART RATE: 68 BPM | SYSTOLIC BLOOD PRESSURE: 124 MMHG | OXYGEN SATURATION: 98 % | RESPIRATION RATE: 18 BRPM | BODY MASS INDEX: 24.55 KG/M2 | DIASTOLIC BLOOD PRESSURE: 77 MMHG

## 2021-03-25 PROCEDURE — 99214 OFFICE O/P EST MOD 30 MIN: CPT

## 2021-03-25 PROCEDURE — 99072 ADDL SUPL MATRL&STAF TM PHE: CPT

## 2021-03-26 LAB — FERRITIN SERPL-MCNC: 181 NG/ML

## 2021-03-26 NOTE — ASSESSMENT
[FreeTextEntry1] : He does not exhibit signs or symptoms related to these findings, including B symptoms.\par A complete hematological workup was undertaken.  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 He denies B symptoms and does not have any significant cytopenias.  There were no significantly enlarged LN's on the scan.  His WBC count has remained stable during the time of his visits with me.  I will continue to monitor this closely. Return in 3 months for a followup and labs. He knows that he should call or come back to the office should he develop any new or worrisome symptoms.\par CAT scan results were faxed to Dr. Saldana at [facility] who performed his arteriogram. He has been referred to Dr. Huang of vascular surgery.  He tells me this is being monitored closely and he will have repeat imaging 1 year from the last.  \par I obtained an US of the abdomen on 10/9/19 that showed a nl liver structure and size and a spleen measuring 15.6 x 9.5 x 14.9 cm that was slightly improved from 4/12/19.\par He reports a recent normal PSA level.  \par I have offered to him to fax the CAT scan results to his urologist, however he did not wish for me to do this.\par Reviewed results of CBC and CMP which were stable.  He denies new symptoms. Continue observation.\par Continue routine, age-appropriate, healthcare maintenance \par Office visit in 3 months or prn for new or worsening symptoms. \par

## 2021-03-26 NOTE — PHYSICAL EXAM
[Fully active, able to carry on all pre-disease performance without restriction] : Status 0 - Fully active, able to carry on all pre-disease performance without restriction [Normal] : affect appropriate [de-identified] : no palpable splenomegaly

## 2021-03-26 NOTE — REVIEW OF SYSTEMS
[Anxiety] : anxiety [Negative] : Allergic/Immunologic [de-identified] : klonopin in am for anxiety [FreeTextEntry1] : NSAID allergy, rash and hives

## 2021-03-26 NOTE — HISTORY OF PRESENT ILLNESS
[0 - No Distress] : Distress Level: 0 [de-identified] : This is a very pleasant 65-year-old gentleman the past medical history who on a routine the physical examination was noted to have a leukocytosis and mild thrombocytopenia.  These were repeated and the white count increased to 29,000. The differential showed a predominance of leukocytes.  He denies  knowledge of any similar finding in the past.  He denies B symptoms.  He denies fevers, chills, headaches, night sweats, loss of appetite or weight, chest pain, chest palpitations, shortness of breath, N/V/D, and pain, new lumps or bumps, signs of blood loss, LE swelling or pain, rashes/ecchymosis/petechiae.  The w/u showed a CLL phenotype on his flow cytometry.  He is here for f/u with labs. [FreeTextEntry1] : CLL observation [de-identified] : Mr. Duque is here today for a follow up visit for CLL.  He has had nocturia and decreased flow. He saw urology and was started on Flomax. He denies "B" symptoms. omplaints today.

## 2021-05-10 LAB
DEPRECATED KAPPA LC FREE/LAMBDA SER: 0.36 RATIO
IGA SER QL IEP: 105 MG/DL
IGG SER QL IEP: 901 MG/DL
IGM SER QL IEP: 30 MG/DL
IRON SATN MFR SERPL: 21 %
IRON SERPL-MCNC: 61 UG/DL
KAPPA LC CSF-MCNC: 4.26 MG/DL
KAPPA LC SERPL-MCNC: 1.53 MG/DL
PSA FREE FLD-MCNC: 33 %
PSA FREE SERPL-MCNC: 0.66 NG/ML
PSA SERPL-MCNC: 1.99 NG/ML
TIBC SERPL-MCNC: 297 UG/DL
UIBC SERPL-MCNC: 236 UG/DL

## 2021-05-12 ENCOUNTER — RESULT REVIEW (OUTPATIENT)
Age: 66
End: 2021-05-12

## 2021-07-22 ENCOUNTER — APPOINTMENT (OUTPATIENT)
Dept: UROLOGY | Facility: CLINIC | Age: 66
End: 2021-07-22
Payer: COMMERCIAL

## 2021-07-22 VITALS
BODY MASS INDEX: 24.25 KG/M2 | TEMPERATURE: 98 F | HEIGHT: 68 IN | HEART RATE: 65 BPM | DIASTOLIC BLOOD PRESSURE: 68 MMHG | SYSTOLIC BLOOD PRESSURE: 105 MMHG | WEIGHT: 160 LBS

## 2021-07-22 PROCEDURE — 51798 US URINE CAPACITY MEASURE: CPT

## 2021-07-22 PROCEDURE — 99072 ADDL SUPL MATRL&STAF TM PHE: CPT

## 2021-07-22 PROCEDURE — 99214 OFFICE O/P EST MOD 30 MIN: CPT

## 2021-07-22 RX ORDER — DIAZEPAM 10 MG/1
10 TABLET ORAL
Refills: 0 | Status: DISCONTINUED | COMMUNITY
End: 2021-07-22

## 2021-07-22 NOTE — ADDENDUM
[FreeTextEntry1] : PVR\par A transabdominal ultrasound was performed\par Images of bladder pain in the transverse and longitudinal\par Postvoid residual and area T. void was only 40 cc

## 2021-07-22 NOTE — ASSESSMENT
[FreeTextEntry1] : This is a routine followup for this 65-year-old patient with a family history of prostate cancer who was found to have a slightly elevated PSA but after course of antibiotics and follow the PSA has had a progressive decline\par In July of 2020 was 3.9by September the same be a 3.5\par In January of this year is 2.6 and my most current March was 1.9 with a very high free PSA\par The digital rectal exam is very benign feeling\par I believe the patient has a history of prostatitis but he continues to haveurinary frequency despite 2 Flomax a day\par His been unable to get Cialis 5 mg through his insurance plan surgery is requested a prescription sent to [facility]\par I suggested a course of antibiotics would make sense given his declining PSA still frequency as he may have underlying prostatitis\par He decided that antibiotics upset this stomach so he does not wish to take this course of antibiotics\par I Checed his post void residual because he continues to have frequency\par He stated he voided about an hour ago and is post which was only 40 cc

## 2021-08-31 NOTE — REVIEW OF SYSTEMS
[Anxiety] : anxiety [Negative] : Allergic/Immunologic [de-identified] : klonopin in am for anxiety [FreeTextEntry1] : NSAID allergy, rash and hives

## 2021-08-31 NOTE — PHYSICAL EXAM
[Fully active, able to carry on all pre-disease performance without restriction] : Status 0 - Fully active, able to carry on all pre-disease performance without restriction [Normal] : affect appropriate [de-identified] : no palpable splenomegaly

## 2021-08-31 NOTE — ASSESSMENT
[FreeTextEntry1] : He does not exhibit signs or symptoms related to these findings, including B symptoms.\par A complete hematological workup was undertaken.  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 He denies B symptoms and does not have any significant cytopenias.  There were no significantly enlarged LN's on the scan.  His WBC count has remained stable during the time of his visits with me.  I will continue to monitor this closely. Return in 3 months for a followup and labs. He knows that he should call or come back to the office should he develop any new or worrisome symptoms.\par CAT scan results were faxed to Dr. Saldana at [facility] who performed his arteriogram. He has been referred to Dr. Huang of vascular surgery.  He tells me this is being monitored closely and he will have repeat imaging 1 year from the last.  \par I obtained an US of the abdomen on 10/9/19 that showed a nl liver structure and size and a spleen measuring 15.6 x 9.5 x 14.9 cm that was slightly improved from 4/12/19.\par He reports a recent normal PSA level.  \par I have offered to him to fax the CAT scan results to his urologist, however he did not wish for me to do this.\par Reviewed results of CBC and CMP which were stable.  He denies new symptoms. Continue observation.\par Continue routine, age-appropriate, healthcare maintenance \par Office visit in 3 months or prn for new or worsening symptoms. \par

## 2021-08-31 NOTE — HISTORY OF PRESENT ILLNESS
[de-identified] : This is a very pleasant 65-year-old gentleman the past medical history who on a routine the physical examination was noted to have a leukocytosis and mild thrombocytopenia.  These were repeated and the white count increased to 29,000. The differential showed a predominance of leukocytes.  He denies  knowledge of any similar finding in the past.  He denies B symptoms.  He denies fevers, chills, headaches, night sweats, loss of appetite or weight, chest pain, chest palpitations, shortness of breath, N/V/D, and pain, new lumps or bumps, signs of blood loss, LE swelling or pain, rashes/ecchymosis/petechiae.  The w/u showed a CLL phenotype on his flow cytometry.  He is here for f/u with labs. [FreeTextEntry1] : CLL observation [de-identified] : Mr. Duque is here today for a follow up visit for CLL.  He has had nocturia and decreased flow. He saw urology and was started on Flomax. He denies "B" symptoms. omplaints today. [0 - No Distress] : Distress Level: 0

## 2021-09-02 ENCOUNTER — NON-APPOINTMENT (OUTPATIENT)
Age: 66
End: 2021-09-02

## 2021-09-02 ENCOUNTER — APPOINTMENT (OUTPATIENT)
Dept: HEMATOLOGY ONCOLOGY | Facility: CLINIC | Age: 66
End: 2021-09-02

## 2021-09-29 ENCOUNTER — RX RENEWAL (OUTPATIENT)
Age: 66
End: 2021-09-29

## 2021-10-26 ENCOUNTER — NON-APPOINTMENT (OUTPATIENT)
Age: 66
End: 2021-10-26

## 2021-10-26 DIAGNOSIS — F41.9 ANXIETY DISORDER, UNSPECIFIED: ICD-10-CM

## 2021-10-26 DIAGNOSIS — R16.1 SPLENOMEGALY, NOT ELSEWHERE CLASSIFIED: ICD-10-CM

## 2021-10-26 DIAGNOSIS — D69.6 THROMBOCYTOPENIA, UNSPECIFIED: ICD-10-CM

## 2021-10-27 ENCOUNTER — APPOINTMENT (OUTPATIENT)
Dept: CARDIOLOGY | Facility: CLINIC | Age: 66
End: 2021-10-27
Payer: COMMERCIAL

## 2021-10-27 ENCOUNTER — NON-APPOINTMENT (OUTPATIENT)
Age: 66
End: 2021-10-27

## 2021-10-27 VITALS
HEIGHT: 68 IN | HEART RATE: 80 BPM | OXYGEN SATURATION: 97 % | BODY MASS INDEX: 24.55 KG/M2 | WEIGHT: 162 LBS | DIASTOLIC BLOOD PRESSURE: 64 MMHG | RESPIRATION RATE: 17 BRPM | SYSTOLIC BLOOD PRESSURE: 110 MMHG | TEMPERATURE: 98 F

## 2021-10-27 PROCEDURE — 93246 EXT ECG>7D<15D RECORDING: CPT

## 2021-10-27 PROCEDURE — 99203 OFFICE O/P NEW LOW 30 MIN: CPT | Mod: 25

## 2021-10-27 PROCEDURE — 93000 ELECTROCARDIOGRAM COMPLETE: CPT | Mod: 59

## 2021-10-27 RX ORDER — ELECTROLYTES/DEXTROSE
SOLUTION, ORAL ORAL DAILY
Refills: 0 | Status: ACTIVE | COMMUNITY

## 2021-10-27 NOTE — HISTORY OF PRESENT ILLNESS
[FreeTextEntry1] : Mr Duque has a history of an abnormal ekg by Dr Bach his hematologist who wants a baseline prior to starting a new medication for CLL.\par He has seen a cardiologist in the past-for palpitations. 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 He is followed for a cerebral aneurysm.\par \par He denies chest pain, dyspnea,  or syncope.\par He has  had palpitations since childhood, he gets them frequently, like it stops for a second then goes "boom boom boom boom", sometimes fast for 10 seconds.\par He walks daily for a hours without difficulty, he does get palpitations sometimes with that. He played handball a few times in the spring, had palpitations at the time.Nonlimiting\par

## 2021-11-21 ENCOUNTER — RESULT REVIEW (OUTPATIENT)
Age: 66
End: 2021-11-21

## 2021-11-23 PROCEDURE — 93248 EXT ECG>7D<15D REV&INTERPJ: CPT

## 2022-03-28 ENCOUNTER — APPOINTMENT (OUTPATIENT)
Dept: CARDIOLOGY | Facility: CLINIC | Age: 67
End: 2022-03-28
Payer: COMMERCIAL

## 2022-03-28 ENCOUNTER — NON-APPOINTMENT (OUTPATIENT)
Age: 67
End: 2022-03-28

## 2022-03-28 VITALS
SYSTOLIC BLOOD PRESSURE: 114 MMHG | TEMPERATURE: 98 F | WEIGHT: 156 LBS | HEART RATE: 83 BPM | BODY MASS INDEX: 23.64 KG/M2 | HEIGHT: 68 IN | DIASTOLIC BLOOD PRESSURE: 78 MMHG | OXYGEN SATURATION: 96 %

## 2022-03-28 PROCEDURE — 93000 ELECTROCARDIOGRAM COMPLETE: CPT

## 2022-03-28 PROCEDURE — 99214 OFFICE O/P EST MOD 30 MIN: CPT

## 2022-03-28 NOTE — HISTORY OF PRESENT ILLNESS
[FreeTextEntry1] : Mr Duque has a history of an abnormal ekg by Dr Bach his hematologist who wants a baseline prior to starting a new medication for CLL.\par He has seen a cardiologist in the past-for palpitations. 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 He is followed for a cerebral aneurysm.\par \par There have been no hospitalizations since last visit.\par He is still following with heme/onc not yet  treated for his CLL.\par \par He denies chest pain, dyspnea,  he gets rare palpitations , no syncope.\par \par He sees a psychiatrist and is weaning off xanax.\par He plays handball in the nice weather.\par \par \par \par

## 2022-03-28 NOTE — CARDIOLOGY SUMMARY
[de-identified] : 3/28/22-nsr lad, lafb, minor rivcd, possible asmi with Qs V2 [de-identified] : jose 10/2021- rare ectopy [de-identified] : 11/22/21-s/e nonischemic

## 2022-04-15 ENCOUNTER — RESULT REVIEW (OUTPATIENT)
Age: 67
End: 2022-04-15

## 2022-06-23 ENCOUNTER — RX RENEWAL (OUTPATIENT)
Age: 67
End: 2022-06-23

## 2022-06-27 ENCOUNTER — RX RENEWAL (OUTPATIENT)
Age: 67
End: 2022-06-27

## 2022-10-26 ENCOUNTER — RX RENEWAL (OUTPATIENT)
Age: 67
End: 2022-10-26

## 2023-01-31 ENCOUNTER — APPOINTMENT (OUTPATIENT)
Dept: UROLOGY | Facility: CLINIC | Age: 68
End: 2023-01-31
Payer: COMMERCIAL

## 2023-01-31 VITALS — HEIGHT: 69 IN | WEIGHT: 155 LBS | BODY MASS INDEX: 22.96 KG/M2

## 2023-01-31 DIAGNOSIS — Z80.42 FAMILY HISTORY OF MALIGNANT NEOPLASM OF PROSTATE: ICD-10-CM

## 2023-01-31 DIAGNOSIS — Z87.891 PERSONAL HISTORY OF NICOTINE DEPENDENCE: ICD-10-CM

## 2023-01-31 PROCEDURE — 99213 OFFICE O/P EST LOW 20 MIN: CPT

## 2023-01-31 NOTE — ASSESSMENT
[FreeTextEntry1] : Patient is a 67 year male who presents with BPH with LTUS.  He has  been on Flomax 0.8 mg QD for several years with minimal results.  He has urgency and frequency but most bothersome is the nocturia 3-4 x a night with decreased stream.  \par no associated pain.  no associated hematuria or dysuria.  no modifying factors.\par PSA 1.16 in 11/22 and RADHA is normal today\par \par A/P\par 1. BPH with LTUS \par stop Flomax will try Uroxatrol 10 mg QD\par 2. prostate cancer screening \par RADHA and PSA normal\par office in 1 month

## 2023-01-31 NOTE — PHYSICAL EXAM
[General Appearance - Well Developed] : well developed [Normal Appearance] : normal appearance [General Appearance - In No Acute Distress] : no acute distress [Respiration, Rhythm And Depth] : normal respiratory rhythm and effort [Abdomen Soft] : soft [Abdomen Hernia] : no hernia was discovered [Urethral Meatus] : meatus normal [Penis Abnormality] : normal circumcised penis [Scrotum] : the scrotum was normal [Epididymis] : the epididymides were normal [Testes Tenderness] : no tenderness of the testes [Testes Mass (___cm)] : there were no testicular masses [Anus Abnormality] : the anus and perineum were normal [Rectal Exam - Rectum] : digital rectal exam was normal [Prostate Enlargement] : the prostate was not enlarged [Prostate Tenderness] : the prostate was not tender [No Prostate Nodules] : no prostate nodules [Prostate Size ___ gm] : prostate size [unfilled] gm [Normal Station and Gait] : the gait and station were normal for the patient's age [Skin Color & Pigmentation] : normal skin color and pigmentation [] : no rash [Oriented To Time, Place, And Person] : oriented to person, place, and time [Inguinal Lymph Nodes Enlarged Bilaterally] : inguinal

## 2023-02-27 ENCOUNTER — RX RENEWAL (OUTPATIENT)
Age: 68
End: 2023-02-27

## 2023-03-07 ENCOUNTER — APPOINTMENT (OUTPATIENT)
Dept: UROLOGY | Facility: CLINIC | Age: 68
End: 2023-03-07
Payer: COMMERCIAL

## 2023-03-07 PROCEDURE — 99213 OFFICE O/P EST LOW 20 MIN: CPT

## 2023-03-14 NOTE — PHYSICAL EXAM
[General Appearance - Well Developed] : well developed [Normal Appearance] : normal appearance [General Appearance - In No Acute Distress] : no acute distress [Abdomen Soft] : soft [Abdomen Hernia] : no hernia was discovered [Urethral Meatus] : meatus normal [Penis Abnormality] : normal circumcised penis [Scrotum] : the scrotum was normal [Epididymis] : the epididymides were normal [Testes Tenderness] : no tenderness of the testes [Testes Mass (___cm)] : there were no testicular masses [Anus Abnormality] : the anus and perineum were normal [Rectal Exam - Rectum] : digital rectal exam was normal [Prostate Enlargement] : the prostate was not enlarged [Prostate Tenderness] : the prostate was not tender [No Prostate Nodules] : no prostate nodules [Prostate Size ___ gm] : prostate size [unfilled] gm [Skin Color & Pigmentation] : normal skin color and pigmentation [] : no respiratory distress [Respiration, Rhythm And Depth] : normal respiratory rhythm and effort [Oriented To Time, Place, And Person] : oriented to person, place, and time [Inguinal Lymph Nodes Enlarged Bilaterally] : inguinal

## 2023-03-14 NOTE — ASSESSMENT
[FreeTextEntry1] : Patient is a 67 year male with BPH and hx of elevated PSA.  \par no interval pain or new voiding symptoms.  no modifying factors.\par doing well on Alfuzosin 10 mg QD\par PSA was 1.19 in 11/22\par RADHA normal today\par \par A/P\par 1. BPH \par 2. prostate cancer screening \par continue Alfuzosin\par office in 1 year

## 2023-03-27 RX ORDER — TAMSULOSIN HYDROCHLORIDE 0.4 MG/1
0.4 CAPSULE ORAL
Qty: 60 | Refills: 5 | Status: COMPLETED | COMMUNITY
Start: 2021-09-29 | End: 2023-03-27

## 2023-03-28 ENCOUNTER — NON-APPOINTMENT (OUTPATIENT)
Age: 68
End: 2023-03-28

## 2023-04-29 ENCOUNTER — NON-APPOINTMENT (OUTPATIENT)
Age: 68
End: 2023-04-29

## 2023-05-01 ENCOUNTER — APPOINTMENT (OUTPATIENT)
Dept: CARDIOLOGY | Facility: CLINIC | Age: 68
End: 2023-05-01
Payer: COMMERCIAL

## 2023-05-01 ENCOUNTER — NON-APPOINTMENT (OUTPATIENT)
Age: 68
End: 2023-05-01

## 2023-05-01 VITALS
SYSTOLIC BLOOD PRESSURE: 112 MMHG | OXYGEN SATURATION: 97 % | WEIGHT: 157 LBS | BODY MASS INDEX: 23.18 KG/M2 | HEART RATE: 97 BPM | DIASTOLIC BLOOD PRESSURE: 60 MMHG

## 2023-05-01 DIAGNOSIS — Z86.79 PERSONAL HISTORY OF OTHER DISEASES OF THE CIRCULATORY SYSTEM: ICD-10-CM

## 2023-05-01 PROCEDURE — 93246 EXT ECG>7D<15D RECORDING: CPT

## 2023-05-01 PROCEDURE — 99215 OFFICE O/P EST HI 40 MIN: CPT

## 2023-05-01 PROCEDURE — 93000 ELECTROCARDIOGRAM COMPLETE: CPT

## 2023-05-01 RX ORDER — ESCITALOPRAM OXALATE 10 MG/1
10 TABLET ORAL
Refills: 0 | Status: ACTIVE | COMMUNITY
Start: 2022-03-01

## 2023-05-01 RX ORDER — BUPROPION HYDROCHLORIDE 150 MG/1
150 TABLET, EXTENDED RELEASE ORAL DAILY
Refills: 0 | Status: ACTIVE | COMMUNITY
Start: 2023-05-01

## 2023-05-01 NOTE — HISTORY OF PRESENT ILLNESS
[FreeTextEntry1] : Mr Duque has a history of an abnormal ekg by Dr Bach his hematologist who wants a baseline prior to starting a new medication for CLL.\par He has seen a cardiologist in the past-for palpitations. 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 He is followed for a cerebral aneurysm.\par \par There have been no hospitalizations since last visit.\par \par He denies chest pain, dyspnea,  , no syncope.He has daily palpitation, skips then fills up with blood and goes "boom-boom".\par \par He will be starting a medication for his CLL today. zanubrutinib (brukinsa), is concerned about potential cardiac side effects including atrial fibrillation and hypertension.\par \par He sees a psychiatrist  every 6 weeks.\par He walks daily.\par \par \par

## 2023-05-01 NOTE — CARDIOLOGY SUMMARY
[de-identified] : 5/1/23-kendrick quiroz [de-identified] : jose 10/2021- rare ectopy [de-identified] : 11/22/21-s/e nonischemic\par mild mr/tr, dilated la, DD [de-identified] : 4/15/22- calcium score zero, aortic calcium

## 2023-05-17 ENCOUNTER — APPOINTMENT (OUTPATIENT)
Dept: CARDIOLOGY | Facility: CLINIC | Age: 68
End: 2023-05-17
Payer: COMMERCIAL

## 2023-05-17 PROCEDURE — 93306 TTE W/DOPPLER COMPLETE: CPT

## 2023-05-22 PROCEDURE — 93248 EXT ECG>7D<15D REV&INTERPJ: CPT

## 2023-05-25 ENCOUNTER — RX RENEWAL (OUTPATIENT)
Age: 68
End: 2023-05-25

## 2023-06-26 ENCOUNTER — RX RENEWAL (OUTPATIENT)
Age: 68
End: 2023-06-26

## 2023-07-24 ENCOUNTER — NON-APPOINTMENT (OUTPATIENT)
Age: 68
End: 2023-07-24

## 2023-07-24 ENCOUNTER — RESULT CHARGE (OUTPATIENT)
Age: 68
End: 2023-07-24

## 2023-07-24 ENCOUNTER — APPOINTMENT (OUTPATIENT)
Dept: CARDIOLOGY | Facility: CLINIC | Age: 68
End: 2023-07-24
Payer: COMMERCIAL

## 2023-07-24 VITALS
HEART RATE: 66 BPM | SYSTOLIC BLOOD PRESSURE: 118 MMHG | DIASTOLIC BLOOD PRESSURE: 70 MMHG | WEIGHT: 154 LBS | BODY MASS INDEX: 22.74 KG/M2

## 2023-07-24 DIAGNOSIS — C91.10 CHRONIC LYMPHOCYTIC LEUKEMIA OF B-CELL TYPE NOT HAVING ACHIEVED REMISSION: ICD-10-CM

## 2023-07-24 PROCEDURE — 93000 ELECTROCARDIOGRAM COMPLETE: CPT

## 2023-07-24 PROCEDURE — 99214 OFFICE O/P EST MOD 30 MIN: CPT

## 2023-07-24 RX ORDER — ZANUBRUTINIB 80 MG/1
80 CAPSULE, GELATIN COATED ORAL
Refills: 0 | Status: ACTIVE | COMMUNITY
Start: 2023-07-24

## 2023-07-24 NOTE — REVIEW OF SYSTEMS
[Weight Gain (___ Lbs)] : no recent weight gain [Weight Loss (___ Lbs)] : no recent weight loss [SOB] : no shortness of breath [Dyspnea on exertion] : not dyspnea during exertion [Chest Discomfort] : no chest discomfort [Lower Ext Edema] : no extremity edema [Palpitations] : palpitations [Orthopnea] : no orthopnea [Syncope] : no syncope [Myalgia] : no myalgia [Dizziness] : no dizziness [Easy Bleeding] : no tendency for easy bleeding [Easy Bruising] : no tendency for easy bruising [Negative] : Respiratory

## 2023-07-24 NOTE — REASON FOR VISIT
[FreeTextEntry1] : Comes today for f/u 3 months after starting Brukinsa for CLL\par Here for BP and EKG\par Overall feeling well\par there have been no issues\par Denies CP/SOB\par Continues to have palpitations but notes they are less (Zio in May with rare ectopy )

## 2023-07-24 NOTE — HISTORY OF PRESENT ILLNESS
[FreeTextEntry1] : Mr Duque has a history of an abnormal ekg by Dr Bach his hematologist who wants a baseline prior to starting a new medication for CLL.\par He has seen a cardiologist in the past-for palpitations. 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 He is followed for a cerebral aneurysm.\par \par \par \par

## 2023-07-24 NOTE — CARDIOLOGY SUMMARY
[de-identified] : 5/1/23-kendrick quiroz [de-identified] : Jose 5/1/2023 rare ectopy 1 symptomatic PVC\par jose 10/2021- rare ectopy [de-identified] : 11/22/21-s/e nonischemic\par mild mr/tr, dilated la, DD [de-identified] : 5/17/23  normal LV EF 63%, mild AV calcification no change from previous  [de-identified] : 4/15/22- calcium score zero, aortic calcium

## 2023-09-05 ENCOUNTER — APPOINTMENT (OUTPATIENT)
Dept: UROLOGY | Facility: CLINIC | Age: 68
End: 2023-09-05

## 2023-09-20 ENCOUNTER — NON-APPOINTMENT (OUTPATIENT)
Age: 68
End: 2023-09-20

## 2023-09-22 ENCOUNTER — APPOINTMENT (OUTPATIENT)
Dept: UROLOGY | Facility: CLINIC | Age: 68
End: 2023-09-22

## 2023-09-26 ENCOUNTER — APPOINTMENT (OUTPATIENT)
Dept: UROLOGY | Facility: CLINIC | Age: 68
End: 2023-09-26
Payer: COMMERCIAL

## 2023-09-26 VITALS
HEIGHT: 69 IN | SYSTOLIC BLOOD PRESSURE: 111 MMHG | HEART RATE: 80 BPM | BODY MASS INDEX: 23.7 KG/M2 | WEIGHT: 160 LBS | DIASTOLIC BLOOD PRESSURE: 73 MMHG

## 2023-09-26 PROCEDURE — 99214 OFFICE O/P EST MOD 30 MIN: CPT

## 2023-09-27 LAB — PSA SERPL-MCNC: 1.69 NG/ML

## 2023-10-19 ENCOUNTER — APPOINTMENT (OUTPATIENT)
Dept: UROLOGY | Facility: CLINIC | Age: 68
End: 2023-10-19
Payer: COMMERCIAL

## 2023-10-19 VITALS
HEIGHT: 69 IN | WEIGHT: 160 LBS | DIASTOLIC BLOOD PRESSURE: 67 MMHG | SYSTOLIC BLOOD PRESSURE: 110 MMHG | HEART RATE: 84 BPM | BODY MASS INDEX: 23.7 KG/M2

## 2023-10-19 PROCEDURE — 52000 CYSTOURETHROSCOPY: CPT

## 2023-10-19 PROCEDURE — 76872 US TRANSRECTAL: CPT

## 2023-10-19 RX ORDER — DIAZEPAM 5 MG/1
TABLET ORAL
Refills: 0 | Status: ACTIVE | COMMUNITY

## 2023-11-08 ENCOUNTER — NON-APPOINTMENT (OUTPATIENT)
Age: 68
End: 2023-11-08

## 2023-11-08 ENCOUNTER — APPOINTMENT (OUTPATIENT)
Dept: CARDIOLOGY | Facility: CLINIC | Age: 68
End: 2023-11-08
Payer: COMMERCIAL

## 2023-11-08 VITALS
DIASTOLIC BLOOD PRESSURE: 70 MMHG | BODY MASS INDEX: 23.63 KG/M2 | HEART RATE: 64 BPM | SYSTOLIC BLOOD PRESSURE: 118 MMHG | WEIGHT: 160 LBS

## 2023-11-08 PROCEDURE — 99213 OFFICE O/P EST LOW 20 MIN: CPT | Mod: 25

## 2023-11-08 PROCEDURE — 93000 ELECTROCARDIOGRAM COMPLETE: CPT

## 2023-12-12 ENCOUNTER — NON-APPOINTMENT (OUTPATIENT)
Age: 68
End: 2023-12-12

## 2024-03-08 ENCOUNTER — APPOINTMENT (OUTPATIENT)
Dept: UROLOGY | Facility: HOSPITAL | Age: 69
End: 2024-03-08

## 2024-05-01 ENCOUNTER — APPOINTMENT (OUTPATIENT)
Dept: CARDIOLOGY | Facility: CLINIC | Age: 69
End: 2024-05-01
Payer: COMMERCIAL

## 2024-05-01 ENCOUNTER — NON-APPOINTMENT (OUTPATIENT)
Age: 69
End: 2024-05-01

## 2024-05-01 VITALS
SYSTOLIC BLOOD PRESSURE: 82 MMHG | BODY MASS INDEX: 23.4 KG/M2 | WEIGHT: 158 LBS | DIASTOLIC BLOOD PRESSURE: 58 MMHG | HEIGHT: 69 IN

## 2024-05-01 VITALS — SYSTOLIC BLOOD PRESSURE: 98 MMHG | DIASTOLIC BLOOD PRESSURE: 50 MMHG

## 2024-05-01 DIAGNOSIS — R42 DIZZINESS AND GIDDINESS: ICD-10-CM

## 2024-05-01 DIAGNOSIS — R94.31 ABNORMAL ELECTROCARDIOGRAM [ECG] [EKG]: ICD-10-CM

## 2024-05-01 DIAGNOSIS — I77.72 DISSECTION OF ILIAC ARTERY: ICD-10-CM

## 2024-05-01 DIAGNOSIS — R00.2 PALPITATIONS: ICD-10-CM

## 2024-05-01 DIAGNOSIS — Z78.9 OTHER SPECIFIED HEALTH STATUS: ICD-10-CM

## 2024-05-01 PROCEDURE — 99214 OFFICE O/P EST MOD 30 MIN: CPT

## 2024-05-01 PROCEDURE — 36415 COLL VENOUS BLD VENIPUNCTURE: CPT

## 2024-05-01 PROCEDURE — 93000 ELECTROCARDIOGRAM COMPLETE: CPT

## 2024-05-01 RX ORDER — TADALAFIL 5 MG/1
5 TABLET ORAL
Refills: 0 | Status: ACTIVE | COMMUNITY
Start: 2024-05-01

## 2024-05-01 NOTE — REVIEW OF SYSTEMS
[Palpitations] : palpitations [Negative] : Respiratory [Weight Gain (___ Lbs)] : no recent weight gain [Weight Loss (___ Lbs)] : no recent weight loss [SOB] : no shortness of breath [Dyspnea on exertion] : not dyspnea during exertion [Chest Discomfort] : no chest discomfort [Lower Ext Edema] : no extremity edema [Orthopnea] : no orthopnea [Syncope] : no syncope [Myalgia] : no myalgia [Dizziness] : no dizziness [Easy Bleeding] : no tendency for easy bleeding [Easy Bruising] : no tendency for easy bruising

## 2024-05-01 NOTE — HISTORY OF PRESENT ILLNESS
[FreeTextEntry1] : Mr Duque has a history of an abnormal ekg by Dr Bach his hematologist who wants a baseline prior to starting a new medication for CLL.\par  He has seen a cardiologist in the past-for palpitations. 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 He is followed for a cerebral aneurysm.\par  \par  \par  \par

## 2024-05-01 NOTE — CARDIOLOGY SUMMARY
[de-identified] : 5/1/23-kendrick quiroz [de-identified] : Jose 5/1/2023 rare ectopy 1 symptomatic PVC\par  jose 10/2021- rare ectopy [de-identified] : 11/22/21-s/e nonischemic\par  mild mr/tr, dilated la, DD [de-identified] : 5/17/23  normal LV EF 63%, mild AV calcification no change from previous  [de-identified] : 4/15/22- calcium score zero, aortic calcium

## 2024-05-01 NOTE — REASON FOR VISIT
[FreeTextEntry1] : Comes today for f/u  started Brukinsa for CLL 1 year ago No changes in treatment, has been stable Don reports almost daily episodes of "brown out" States has had for a while and lately have incresed in frequency Usually occur when he is active He feels a pressure in face with beginning of darkness, no syncope.  Has a slight headache after but continues his normal activity No palpitations Denies CP/SOB

## 2024-05-02 ENCOUNTER — APPOINTMENT (OUTPATIENT)
Dept: CARDIOLOGY | Facility: CLINIC | Age: 69
End: 2024-05-02

## 2024-05-02 LAB
ALBUMIN SERPL ELPH-MCNC: 4.4 G/DL
ALP BLD-CCNC: 63 U/L
ALT SERPL-CCNC: 14 U/L
ANION GAP SERPL CALC-SCNC: 10 MMOL/L
AST SERPL-CCNC: 21 U/L
BILIRUB SERPL-MCNC: 1 MG/DL
BUN SERPL-MCNC: 14 MG/DL
CALCIUM SERPL-MCNC: 9.2 MG/DL
CHLORIDE SERPL-SCNC: 105 MMOL/L
CHOLEST SERPL-MCNC: 163 MG/DL
CO2 SERPL-SCNC: 25 MMOL/L
CREAT SERPL-MCNC: 1.06 MG/DL
EGFR: 76 ML/MIN/1.73M2
GLUCOSE SERPL-MCNC: 105 MG/DL
HCT VFR BLD CALC: 40.5 %
HDLC SERPL-MCNC: 56 MG/DL
HGB BLD-MCNC: 13.1 G/DL
LDLC SERPL CALC-MCNC: 95 MG/DL
MCHC RBC-ENTMCNC: 32 PG
MCHC RBC-ENTMCNC: 32.3 GM/DL
MCV RBC AUTO: 99 FL
NONHDLC SERPL-MCNC: 107 MG/DL
PLATELET # BLD AUTO: 108 K/UL
POTASSIUM SERPL-SCNC: 4.1 MMOL/L
PROT SERPL-MCNC: 6.3 G/DL
RBC # BLD: 4.09 M/UL
RBC # FLD: 12.9 %
SODIUM SERPL-SCNC: 140 MMOL/L
TRIGL SERPL-MCNC: 61 MG/DL
WBC # FLD AUTO: 3.8 K/UL

## 2024-05-21 ENCOUNTER — APPOINTMENT (OUTPATIENT)
Dept: UROLOGY | Facility: CLINIC | Age: 69
End: 2024-05-21
Payer: COMMERCIAL

## 2024-05-21 VITALS
WEIGHT: 158 LBS | HEART RATE: 93 BPM | HEIGHT: 69 IN | BODY MASS INDEX: 23.4 KG/M2 | SYSTOLIC BLOOD PRESSURE: 108 MMHG | DIASTOLIC BLOOD PRESSURE: 69 MMHG

## 2024-05-21 DIAGNOSIS — N13.8 BENIGN PROSTATIC HYPERPLASIA WITH LOWER URINARY TRACT SYMPMS: ICD-10-CM

## 2024-05-21 DIAGNOSIS — R97.20 ELEVATED PROSTATE, SPECIFIC ANTIGEN [PSA]: ICD-10-CM

## 2024-05-21 DIAGNOSIS — N40.1 BENIGN PROSTATIC HYPERPLASIA WITH LOWER URINARY TRACT SYMPMS: ICD-10-CM

## 2024-05-21 PROCEDURE — 99213 OFFICE O/P EST LOW 20 MIN: CPT

## 2024-05-21 NOTE — ASSESSMENT
[FreeTextEntry1] : Patient is a 68-year-old male with BPH with obstruction. He has been on alfuzosin with the addition of tadalafil 5 mg p.o. daily in the past.  He had to stop the medication due to low blood pressure.  He still gets up 2-3 times at night and has episodes of significant urgency and frequency.  It has gotten slightly better since he quit alcohol 3 months ago.  His digital rectal exam today is normal and a PSA was drawn.  Assessment and plan 1. BPH with obstruction with lower tract urinary symptoms especially nocturia Currently off all medication.  I had an extensive discussion with him about options including UroLift, Rezum, aqua ablation, TURP, prostatic artery embolization again.  He will consider. 2. Prostate cancer screening RADHA was normal today we will get PSA. He will follow-up in 6 months.

## 2024-05-22 LAB — PSA SERPL-MCNC: 7.95 NG/ML

## 2024-05-30 LAB — PSA SERPL-MCNC: 8.33 NG/ML

## 2024-06-05 ENCOUNTER — NON-APPOINTMENT (OUTPATIENT)
Age: 69
End: 2024-06-05

## 2024-06-19 ENCOUNTER — RX RENEWAL (OUTPATIENT)
Age: 69
End: 2024-06-19

## 2024-06-19 ENCOUNTER — APPOINTMENT (OUTPATIENT)
Dept: UROLOGY | Facility: CLINIC | Age: 69
End: 2024-06-19
Payer: COMMERCIAL

## 2024-06-19 PROCEDURE — 99441: CPT

## 2024-06-19 RX ORDER — ALFUZOSIN HYDROCHLORIDE 10 MG/1
10 TABLET, EXTENDED RELEASE ORAL
Qty: 90 | Refills: 0 | Status: ACTIVE | COMMUNITY
Start: 2023-01-31 | End: 1900-01-01

## 2024-06-19 RX ORDER — TADALAFIL 5 MG/1
5 TABLET ORAL
Qty: 90 | Refills: 0 | Status: ACTIVE | COMMUNITY
Start: 2023-03-27 | End: 1900-01-01

## 2024-07-05 ENCOUNTER — RESULT REVIEW (OUTPATIENT)
Age: 69
End: 2024-07-05

## 2024-07-17 ENCOUNTER — APPOINTMENT (OUTPATIENT)
Dept: UROLOGY | Facility: CLINIC | Age: 69
End: 2024-07-17
Payer: COMMERCIAL

## 2024-07-17 PROCEDURE — 99441: CPT

## 2024-08-05 ENCOUNTER — NON-APPOINTMENT (OUTPATIENT)
Age: 69
End: 2024-08-05

## 2024-08-05 PROBLEM — Z76.89 ENCOUNTER FOR BIOPSY: Status: ACTIVE | Noted: 2024-08-05

## 2024-08-07 ENCOUNTER — OUTPATIENT (OUTPATIENT)
Dept: OUTPATIENT SERVICES | Facility: HOSPITAL | Age: 69
LOS: 1 days | End: 2024-08-07
Payer: COMMERCIAL

## 2024-08-07 DIAGNOSIS — R97.20 ELEVATED PROSTATE SPECIFIC ANTIGEN [PSA]: ICD-10-CM

## 2024-08-07 PROCEDURE — C8001: CPT

## 2024-08-14 ENCOUNTER — APPOINTMENT (OUTPATIENT)
Dept: UROLOGY | Facility: CLINIC | Age: 69
End: 2024-08-14

## 2024-09-10 ENCOUNTER — RESULT REVIEW (OUTPATIENT)
Age: 69
End: 2024-09-10

## 2024-09-11 ENCOUNTER — APPOINTMENT (OUTPATIENT)
Dept: UROLOGY | Facility: CLINIC | Age: 69
End: 2024-09-11
Payer: COMMERCIAL

## 2024-09-11 VITALS
BODY MASS INDEX: 23.7 KG/M2 | WEIGHT: 160 LBS | HEART RATE: 82 BPM | HEIGHT: 69 IN | SYSTOLIC BLOOD PRESSURE: 110 MMHG | DIASTOLIC BLOOD PRESSURE: 69 MMHG

## 2024-09-11 VITALS — DIASTOLIC BLOOD PRESSURE: 71 MMHG | SYSTOLIC BLOOD PRESSURE: 110 MMHG | HEART RATE: 78 BPM

## 2024-09-11 DIAGNOSIS — Z76.89 PERSONS ENCOUNTERING HEALTH SERVICES IN OTHER SPECIFIED CIRCUMSTANCES: ICD-10-CM

## 2024-09-11 DIAGNOSIS — R97.20 ELEVATED PROSTATE, SPECIFIC ANTIGEN [PSA]: ICD-10-CM

## 2024-09-11 PROCEDURE — 76999F: CUSTOM

## 2024-09-11 PROCEDURE — 55700: CPT

## 2024-09-19 ENCOUNTER — APPOINTMENT (OUTPATIENT)
Dept: UROLOGY | Facility: CLINIC | Age: 69
End: 2024-09-19

## 2024-09-19 DIAGNOSIS — N13.8 BENIGN PROSTATIC HYPERPLASIA WITH LOWER URINARY TRACT SYMPMS: ICD-10-CM

## 2024-09-19 DIAGNOSIS — R97.20 ELEVATED PROSTATE, SPECIFIC ANTIGEN [PSA]: ICD-10-CM

## 2024-09-19 DIAGNOSIS — N40.1 BENIGN PROSTATIC HYPERPLASIA WITH LOWER URINARY TRACT SYMPMS: ICD-10-CM

## 2024-09-19 PROCEDURE — 99441: CPT

## 2024-12-04 ENCOUNTER — APPOINTMENT (OUTPATIENT)
Dept: DERMATOLOGY | Facility: CLINIC | Age: 69
End: 2024-12-04

## 2024-12-17 ENCOUNTER — APPOINTMENT (OUTPATIENT)
Dept: UROLOGY | Facility: CLINIC | Age: 69
End: 2024-12-17
Payer: COMMERCIAL

## 2024-12-17 VITALS
BODY MASS INDEX: 23.4 KG/M2 | SYSTOLIC BLOOD PRESSURE: 119 MMHG | HEIGHT: 69 IN | DIASTOLIC BLOOD PRESSURE: 74 MMHG | WEIGHT: 158 LBS

## 2024-12-17 DIAGNOSIS — N40.1 BENIGN PROSTATIC HYPERPLASIA WITH LOWER URINARY TRACT SYMPMS: ICD-10-CM

## 2024-12-17 DIAGNOSIS — N13.8 BENIGN PROSTATIC HYPERPLASIA WITH LOWER URINARY TRACT SYMPMS: ICD-10-CM

## 2024-12-17 DIAGNOSIS — R97.20 ELEVATED PROSTATE, SPECIFIC ANTIGEN [PSA]: ICD-10-CM

## 2024-12-17 PROCEDURE — 99213 OFFICE O/P EST LOW 20 MIN: CPT

## 2024-12-17 RX ORDER — PANTOPRAZOLE 40 MG/1
40 TABLET, DELAYED RELEASE ORAL
Refills: 0 | Status: ACTIVE | COMMUNITY

## 2024-12-17 RX ORDER — ACYCLOVIR 400 MG/1
400 TABLET ORAL
Refills: 0 | Status: ACTIVE | COMMUNITY

## 2024-12-29 LAB — PSA, POST - PROSTATECTOMY: 8.97 NG/ML

## 2025-01-10 ENCOUNTER — APPOINTMENT (OUTPATIENT)
Dept: UROLOGY | Facility: CLINIC | Age: 70
End: 2025-01-10

## 2025-01-10 DIAGNOSIS — N40.1 BENIGN PROSTATIC HYPERPLASIA WITH LOWER URINARY TRACT SYMPMS: ICD-10-CM

## 2025-01-10 DIAGNOSIS — R97.20 ELEVATED PROSTATE, SPECIFIC ANTIGEN [PSA]: ICD-10-CM

## 2025-01-10 DIAGNOSIS — N13.8 BENIGN PROSTATIC HYPERPLASIA WITH LOWER URINARY TRACT SYMPMS: ICD-10-CM

## 2025-01-10 PROCEDURE — 99213 OFFICE O/P EST LOW 20 MIN: CPT | Mod: 93

## 2025-05-11 PROBLEM — Z76.89 ENCOUNTER FOR BIOPSY: Status: RESOLVED | Noted: 2024-08-05 | Resolved: 2025-05-11

## 2025-05-14 ENCOUNTER — NON-APPOINTMENT (OUTPATIENT)
Age: 70
End: 2025-05-14

## 2025-05-14 ENCOUNTER — APPOINTMENT (OUTPATIENT)
Dept: CARDIOLOGY | Facility: CLINIC | Age: 70
End: 2025-05-14
Payer: COMMERCIAL

## 2025-05-14 VITALS
DIASTOLIC BLOOD PRESSURE: 76 MMHG | BODY MASS INDEX: 23.25 KG/M2 | WEIGHT: 157 LBS | HEIGHT: 69 IN | SYSTOLIC BLOOD PRESSURE: 110 MMHG

## 2025-05-14 DIAGNOSIS — R94.31 ABNORMAL ELECTROCARDIOGRAM [ECG] [EKG]: ICD-10-CM

## 2025-05-14 DIAGNOSIS — Z86.79 PERSONAL HISTORY OF OTHER DISEASES OF THE CIRCULATORY SYSTEM: ICD-10-CM

## 2025-05-14 DIAGNOSIS — R00.2 PALPITATIONS: ICD-10-CM

## 2025-05-14 DIAGNOSIS — I77.72 DISSECTION OF ILIAC ARTERY: ICD-10-CM

## 2025-05-14 DIAGNOSIS — Z85.51 PERSONAL HISTORY OF MALIGNANT NEOPLASM OF BLADDER: ICD-10-CM

## 2025-05-14 DIAGNOSIS — Z87.898 PERSONAL HISTORY OF OTHER SPECIFIED CONDITIONS: ICD-10-CM

## 2025-05-14 DIAGNOSIS — Z78.9 OTHER SPECIFIED HEALTH STATUS: ICD-10-CM

## 2025-05-14 PROCEDURE — 93000 ELECTROCARDIOGRAM COMPLETE: CPT

## 2025-05-14 PROCEDURE — 99214 OFFICE O/P EST MOD 30 MIN: CPT

## 2025-06-17 ENCOUNTER — APPOINTMENT (OUTPATIENT)
Dept: UROLOGY | Facility: CLINIC | Age: 70
End: 2025-06-17